# Patient Record
Sex: FEMALE | Race: WHITE | Employment: OTHER | URBAN - METROPOLITAN AREA
[De-identification: names, ages, dates, MRNs, and addresses within clinical notes are randomized per-mention and may not be internally consistent; named-entity substitution may affect disease eponyms.]

---

## 2021-08-13 ENCOUNTER — HOSPITAL ENCOUNTER (OUTPATIENT)
Age: 26
Discharge: HOME | End: 2021-08-13
Payer: COMMERCIAL

## 2021-08-13 VITALS
TEMPERATURE: 98 F | RESPIRATION RATE: 14 BRPM | DIASTOLIC BLOOD PRESSURE: 66 MMHG | HEART RATE: 90 BPM | SYSTOLIC BLOOD PRESSURE: 109 MMHG | OXYGEN SATURATION: 99 %

## 2021-08-13 DIAGNOSIS — H57.89 EYE IRRITATION: Primary | ICD-10-CM

## 2021-08-13 PROCEDURE — 99203 OFFICE O/P NEW LOW 30 MIN: CPT | Performed by: PHYSICIAN ASSISTANT

## 2021-08-13 RX ORDER — TETRACAINE HYDROCHLORIDE 5 MG/ML
1 SOLUTION OPHTHALMIC ONCE
Status: COMPLETED | OUTPATIENT
Start: 2021-08-13 | End: 2021-08-13

## 2021-08-13 NOTE — ED PROVIDER NOTES
Patient Seen in: Immediate [address] [place] [address]      History   Patient presents with:  Eye Problem    Stated Complaint: EYE PROBLEMS    HPI/Subjective:   HPI    Pleasant 33-year-old female.  Patient arrives to the immediate care complaining of an irritation to her secondary to the applied face lotion. We discussed this. We will apply 2 drops of tetracaine to the eye and gently irrigated. Recommend cool compresses and oral antihistamine.       Cool compresses.  Oral antihistamine such as Zyrtec or Benadryl.  Avoid all

## 2021-08-13 NOTE — ED INITIAL ASSESSMENT (HPI)
Pt sts yesterday began with discomfort to inner corner of left eye. Developed watery discharge, swelling to upper and lower lid. Does not wear contacts. Denies recent cold symptoms.